# Patient Record
Sex: MALE | Race: WHITE | Employment: FULL TIME | URBAN - METROPOLITAN AREA
[De-identification: names, ages, dates, MRNs, and addresses within clinical notes are randomized per-mention and may not be internally consistent; named-entity substitution may affect disease eponyms.]

---

## 2018-03-02 ENCOUNTER — OFFICE VISIT (OUTPATIENT)
Dept: FAMILY MEDICINE CLINIC | Facility: CLINIC | Age: 43
End: 2018-03-02

## 2018-03-02 VITALS — DIASTOLIC BLOOD PRESSURE: 118 MMHG | HEART RATE: 91 BPM | SYSTOLIC BLOOD PRESSURE: 182 MMHG

## 2018-03-02 DIAGNOSIS — Z01.30 BLOOD PRESSURE CHECK: Primary | ICD-10-CM

## 2018-03-03 NOTE — PROGRESS NOTES
Pt presents for blood pressure check. Reports he has not seen his pcp in over a year. Reports last year he went to the immediate care and was told his bp was very high and had to be given IV medication to bring down bp.  He states he was never started on bp

## 2018-05-02 ENCOUNTER — OCC HEALTH (OUTPATIENT)
Dept: OCCUPATIONAL MEDICINE | Age: 43
End: 2018-05-02
Attending: PHYSICIAN ASSISTANT